# Patient Record
Sex: FEMALE | Race: BLACK OR AFRICAN AMERICAN
[De-identification: names, ages, dates, MRNs, and addresses within clinical notes are randomized per-mention and may not be internally consistent; named-entity substitution may affect disease eponyms.]

---

## 2017-12-08 ENCOUNTER — HOSPITAL ENCOUNTER (EMERGENCY)
Dept: HOSPITAL 74 - JER | Age: 32
Discharge: HOME | End: 2017-12-08
Payer: COMMERCIAL

## 2017-12-08 VITALS — SYSTOLIC BLOOD PRESSURE: 130 MMHG | DIASTOLIC BLOOD PRESSURE: 85 MMHG | HEART RATE: 60 BPM

## 2017-12-08 VITALS — TEMPERATURE: 98.2 F

## 2017-12-08 VITALS — BODY MASS INDEX: 49.3 KG/M2
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
## 2017-12-08 DIAGNOSIS — E66.9: ICD-10-CM
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
## 2017-12-08 DIAGNOSIS — M25.511: Primary | ICD-10-CM
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
## 2017-12-08 NOTE — PDOC
History of Present Illness





- General


Stated Complaint: SHOULDER PAIN


Time Seen by Provider: 12/08/17 01:23


History Source: Patient





- History of Present Illness


Initial Comments: 





12/08/17 01:28


Patient is a 32 y.o. female with a PMH of obesity who presents to the ED c/o R 

shoulder pain.  Patient states she was chasing her nephew and she ran into the 

side of the wall injuring her shoulder.  Patient denies any head trauma or LOC.

  Patient has been using the arm to complete her ADL's but complains of 

significant pain.





NKDA


Surgical: L ankle


Social: denies cigarettes, denies alcohol, denies recreational drugs





Past History





- Past Medical History


Allergies/Adverse Reactions: 


 Allergies











Allergy/AdvReac Type Severity Reaction Status Date / Time


 


No Known Allergies Allergy   Verified 12/08/17 01:50











Home Medications: 


Ambulatory Orders





No Home Medications 0 dose .ROUTE UTDICT 06/26/14 











- Immunization History


Td Vaccination: Yes


TDAP Vaccination: Yes


Immunization Up to Date: Yes





- Suicide/Smoking/Psychosocial Hx


Smoking Status: No


Smoking History: Never smoked


Number of Cigarettes Smoked Daily: 0


Hx Alcohol Use: No





**Review of Systems





- Review of Systems


Constitutional: No: Chills, Fever


Respiratory: No: Shortness of Breath


Cardiac (ROS): No: Chest Pain


Musculoskeletal: Yes: Joint Pain


All Other Systems: Reviewed and Negative





*Physical Exam





- Physical Exam


General Appearance: Yes: Nourished, Obese


Neck: positive: Trachea midline, Supple


Respiratory/Chest: positive: Lungs Clear


Cardiovascular: positive: S1, S2


Extremity: positive: Normal Capillary Refill, Normal Inspection, Other (RUE 

limited ROM, no TTP, neurovasculary intact)


Neurologic: positive: CNs II-XII NML intact, Fully Oriented, Alert





Medical Decision Making





- Medical Decision Making





12/08/17 02:55


Patient is a 32 y.o. female who presents to the ED c/o R shoulder pain 

following an injury.  On PE patient has limited ROM however is neurovasculary 

intact.  XR of shoulder and hand shows no acute fracture or dislocation.  

Patient to be discharged home with Motrin for pain control as well as referral 

to PCP to establish primary care and OB Gyn for evaluation of menstrual 

irregularities. 











*DC/Admit/Observation/Transfer


Diagnosis at time of Disposition: 


 Injury








- Discharge Dispostion


Disposition: HOME


Condition at time of disposition: Good


Admit: No





- Referrals


Referrals: 


Paul Mayorga [Primary Care Provider] - 


Sharon Betancur MD [Staff Physician] - 





- Patient Instructions


Printed Discharge Instructions:  Shoulder Sprain


Additional Instructions: 


Use Motrin for pain control.  Please follow up with your PCP should your pain 

persist.  Please contact Dr. Broderick (contact information provided) for 

evaluation of your irregular menstrual periods. 





- Post Discharge Activity

## 2017-12-08 NOTE — PDOC
Attending Attestation





- Resident


Resident Name: Janene Carnes





- ED Attending Attestation


I have performed the following: I have examined & evaluated the patient, The 

case was reviewed & discussed with the resident, I agree w/resident's findings 

& plan, Exceptions are as noted





<Roz Kendallan - Last Filed: 12/08/17 01:47>





- HPI


HPI: 





12/08/17 02:26


Patient is a 32 year old female with a significant past medical history of 

unspecified gynecological problem who presents to the ED with complaints of 

right shoulder pain that began tonight.  Patient reports chasing another 

individual while indoor when she ran into a doorway edge causing immediate pain 

to right shoulder.  She reports experiencing right hand numbness and is unsure 

if it is related to her shoulder pain. Patient states she is right hand 

dominant. 





Denies chest pain, SOB. Denies fever, chills. Denies nausea, vomiting. Denies 

lightheadedness, headache. Denies Loss of consciousness, head trauma. Denies 

any other symptoms.  


Allergies: None


Social history: No smoking. No alcohol. No illicit drugs. 


Surgical history: left knee surgery


PMD: Dr. Paul Mayorga








<Federico Neri - Last Filed: 12/08/17 02:26>

## 2019-03-06 PROBLEM — Z00.00 ENCOUNTER FOR PREVENTIVE HEALTH EXAMINATION: Status: ACTIVE | Noted: 2019-03-06

## 2019-03-19 ENCOUNTER — APPOINTMENT (OUTPATIENT)
Dept: SURGERY | Facility: CLINIC | Age: 34
End: 2019-03-19

## 2019-09-02 ENCOUNTER — HOSPITAL ENCOUNTER (EMERGENCY)
Dept: HOSPITAL 74 - JERFT | Age: 34
Discharge: HOME | End: 2019-09-02
Payer: SELF-PAY

## 2019-09-02 VITALS — TEMPERATURE: 98.1 F | HEART RATE: 88 BPM | SYSTOLIC BLOOD PRESSURE: 173 MMHG | DIASTOLIC BLOOD PRESSURE: 70 MMHG

## 2019-09-02 VITALS — BODY MASS INDEX: 58.6 KG/M2

## 2019-09-02 DIAGNOSIS — M54.5: Primary | ICD-10-CM

## 2019-09-02 DIAGNOSIS — E66.01: ICD-10-CM

## 2019-09-02 PROCEDURE — 3E0233Z INTRODUCTION OF ANTI-INFLAMMATORY INTO MUSCLE, PERCUTANEOUS APPROACH: ICD-10-PCS

## 2019-09-02 NOTE — PDOC
History of Present Illness





- General


Chief Complaint: Back Pain


Stated Complaint: PAIN


Time Seen by Provider: 09/02/19 16:34


History Source: Patient





- History of Present Illness


Initial Comments: 





09/02/19 19:49


Chief complaint: Back pain





Patient is a healthy 34-year-old female, overweight complaining of on and off 

back pain for the last few weeks, patient states she went to work and had 

difficulty standing and got worse pain. Patient denies any numbness, dysuria, 

incontinence or saddle anesthesia.  denies any fever.





GENERAL/CONSTITUTIONAL: No fever, weakness. dizziness


HEAD, EYES, EARS, NOSE AND THROAT: No change in vision. No ear pain or 

discharge. No sore throat.


CARDIOVASCULAR: No chest pain


RESPIRATORY: No shortness of breath or cough


GASTROINTESTINAL: No pain, nausea, vomiting, diarrhea or constipation


GENITOURINARY: No dysuria


MUSCULOSKELETAL:  No neck +back pain


SKIN: No rash


NEUROLOGIC: No headache, vertigo, loss of consciousness, or loss of sensation.








GENERAL: The patient is awake, alert, and fully oriented, in no acute distress.


HEAD: Normal with no signs of trauma.


EYES: Pupils equal, round and reactive to light, sclera anicteric, conjunctiva 

clear.


ENT: pharynx: no erythema, no exudate, uvula midline


NECK: supple


CHEST: clear, nontender, rr


ABD: soft, nontender


BACK: Mild lower back tenderness, no signs of injury


EXTREMITIES: Normal range of motion, no edema.


NEUROLOGICAL: Normal speech, normal gait. Cranial nerves II through XII grossly 

intact, no gross focal abnormalities


SKIN: Warm, Dry





Past History





- Past Medical History


Allergies/Adverse Reactions: 


 Allergies











Allergy/AdvReac Type Severity Reaction Status Date / Time


 


No Known Allergies Allergy   Verified 09/02/19 16:34











Home Medications: 


Ambulatory Orders





No Home Medications 0 dose .ROUTE UTDICT 06/26/14 


Cyclobenzaprine HCl [Flexeril 10 mg] 10 mg PO TID PRN #14 tablet 09/02/19 


Meloxicam [Mobic] 15 mg PO DAILY #14 tablet 09/02/19 








COPD: No





- Immunization History


Td Vaccination: Yes


TDAP Vaccination: Yes


Immunization Up to Date: Yes





- Suicide/Smoking/Psychosocial Hx


Smoking Status: No


Smoking History: Never smoked


Number of Cigarettes Smoked Daily: 0


Hx Alcohol Use: No


Drug/Substance Use Hx: No





*Physical Exam





- Vital Signs


 Last Vital Signs











Temp Pulse Resp BP Pulse Ox


 


 98.1 F   88   18   173/70 H  97 


 


 09/02/19 16:34  09/02/19 16:34  09/02/19 16:34  09/02/19 16:34  09/02/19 16:34














Medical Decision Making





- Medical Decision Making





09/02/19 19:51


Patient with ongoing issue of on and off back pain, worse today at work, no 

dysuria, incontinence, numbness or saddle anesthesia. Patient is able to 

ambulate. Patient denies pregnancy. Patient will get Toradol and Flexeril. 

Patient counseled in being overweight and how that contributes to back pain. 

She will follow-up with her doctor she was also made aware of her blood 

pressure reading here although she does not have a history of high blood 

pressure, she will get it checked out her doctors again





Discussed issues, findings, results, applicable medications and treatments and 

follow-up. All these were understood and all questions were answered





*DC/Admit/Observation/Transfer


Diagnosis at time of Disposition: 


Back pain


Qualifiers:


 Back pain location: low back pain Chronicity: unspecified Back pain laterality

: unspecified Sciatica presence: without sciatica Qualified Code(s): M54.5 - 

Low back pain








- Discharge Dispostion


Disposition: HOME


Condition at time of disposition: Stable


Decision to Admit order: No





- Prescriptions


Prescriptions: 


Cyclobenzaprine HCl [Flexeril 10 mg] 10 mg PO TID PRN #14 tablet


 PRN Reason: Back Pain


Meloxicam [Mobic] 15 mg PO DAILY #14 tablet





- Referrals


Referrals: 


Paul Mayorga [Primary Care Provider] - 


Ayad Barnes MD [Staff Physician] - 





- Patient Instructions


Printed Discharge Instructions:  Low Back Pain


Additional Instructions: 


No heavy lifting or bending





Apply ice to the area 20 minutes every 2 hours for the next 2 days





take mobic 15 mg daily for pain and flexeril 1 tab 3 times daily for spasm





Return to the nearest ER if numbness, weakness, severe pain,  problems with 

urinating or having bowel movements.





Call orthopedist today for an appointment for further evaluation





- Post Discharge Activity Nancy Bolanos

## 2019-09-02 NOTE — PDOC
Rapid Medical Evaluation


Chief Complaint: Pain


Time Seen by Provider: 09/02/19 16:34


Medical Evaluation: 


 Allergies











Allergy/AdvReac Type Severity Reaction Status Date / Time


 


No Known Allergies Allergy   Verified 09/02/19 16:34











09/02/19 16:34


HPI: Bacl pain x2 weeks with motion and activity 





PE: No gross deficits 





ORDERS:Nothing 





**Discharge Disposition





- Diagnosis


 Back pain








- Referrals





- Patient Instructions





- Post Discharge Activity